# Patient Record
(demographics unavailable — no encounter records)

---

## 2025-06-02 NOTE — HISTORY OF PRESENT ILLNESS
[de-identified] : 55-year-old female patient is present today for an initial evaluation for swelling of parotid gland. patient states that she came in because she is concerned about the swollen right parotid gland, she had it for a while but her primary referred her. No pain. No history of infections  PMH: Obesity, DM, HTN

## 2025-06-02 NOTE — ASSESSMENT
[FreeTextEntry1] : Parotid gland enlargement vs. underlying parapharyngeal space mass Getting some type of scan today, will call today Needs either CT or MR w contrast

## 2025-06-02 NOTE — PHYSICAL EXAM
[Normal] : mucosa is normal [Midline] : trachea located in midline position [de-identified] : right pharyngeal bulge